# Patient Record
Sex: MALE | Race: WHITE | NOT HISPANIC OR LATINO
[De-identification: names, ages, dates, MRNs, and addresses within clinical notes are randomized per-mention and may not be internally consistent; named-entity substitution may affect disease eponyms.]

---

## 2024-07-17 PROBLEM — Z00.00 ENCOUNTER FOR PREVENTIVE HEALTH EXAMINATION: Status: ACTIVE | Noted: 2024-07-17

## 2024-07-29 ENCOUNTER — APPOINTMENT (OUTPATIENT)
Dept: GASTROENTEROLOGY | Facility: CLINIC | Age: 59
End: 2024-07-29
Payer: COMMERCIAL

## 2024-07-29 PROCEDURE — 91200 LIVER ELASTOGRAPHY: CPT

## 2024-07-30 NOTE — ASSESSMENT
[FreeTextEntry1] : FibroScan/ Vibration Controlled Transient Elastography (VCTE) Report   PROBE SIZE: M   INDICATION: ENCINAS/RIKKI   REFERRING PHYSICIAN: Dr. TRACY Sanchez     PROCEDURE:   Patient was NPO for 4 hours prior to procedure. After providing oral explanations of the procedure to the patient, patient was placed in supine position with right arm in maximum abduction to allow optimal exposure of right lateral abdomen. Patient abdomen was briefly assessed to identify to the terminus of the xyphoid process. The ideal target testing spot was located mid-line and lateral to this point. To acquire proper signals, the skin to liver capsule distance was accessed with the FibroScan probe. Patient was instructed to breathe normally and to abstain from sudden movements during the procedure. Ten shear waves were produced and measurements of the speed of each wave evaluated. Patient tolerated the procedure well and was discharged without incident.     FINDINGS:   - Median shear wave speed of 1.25 meters/second.   - This corresponds to a median Liver Stiffness Score of 4.8 kPa.   - IQR/med 23%   -  dB/m   RESULTS based on  reference scales:   FIBROSIS: F0 Fibrosis   STEATOSIS: S0 - Stage (0-10% steatosis)   The results regarding fibrosis stage and/or steatosis grade are based on current published scientific literature and the provided patient's diagnosis. Any further medical or surgical intervention should be made by considering the overall medical condition of the patient.   Please note transient elastography does not directly measure liver fibrosis and fat content, therefore over-estimation of liver fibrosis may be observed in several conditions, including but not limited to: liver congestion, active hepatitis, ascites, mass within the liver. Clinical correlation is necessary, and liver stiffness readings need to be interpreted with consideration to these potential confounding factors.

## 2025-03-06 ENCOUNTER — APPOINTMENT (OUTPATIENT)
Dept: ORTHOPEDIC SURGERY | Facility: CLINIC | Age: 60
End: 2025-03-06
Payer: COMMERCIAL

## 2025-03-06 VITALS — BODY MASS INDEX: 33.8 KG/M2 | HEIGHT: 73 IN | WEIGHT: 255 LBS

## 2025-03-06 DIAGNOSIS — M23.91 UNSPECIFIED INTERNAL DERANGEMENT OF RIGHT KNEE: ICD-10-CM

## 2025-03-06 DIAGNOSIS — M17.11 UNILATERAL PRIMARY OSTEOARTHRITIS, RIGHT KNEE: ICD-10-CM

## 2025-03-06 PROCEDURE — 20610 DRAIN/INJ JOINT/BURSA W/O US: CPT | Mod: RT

## 2025-03-06 PROCEDURE — 99204 OFFICE O/P NEW MOD 45 MIN: CPT | Mod: 25

## 2025-03-06 PROCEDURE — 73564 X-RAY EXAM KNEE 4 OR MORE: CPT | Mod: RT

## 2025-03-06 RX ORDER — MELOXICAM 15 MG/1
15 TABLET ORAL
Qty: 30 | Refills: 0 | Status: ACTIVE | COMMUNITY
Start: 2025-03-06 | End: 1900-01-01

## 2025-04-02 ENCOUNTER — NON-APPOINTMENT (OUTPATIENT)
Age: 60
End: 2025-04-02

## 2025-04-03 ENCOUNTER — APPOINTMENT (OUTPATIENT)
Dept: ORTHOPEDIC SURGERY | Facility: CLINIC | Age: 60
End: 2025-04-03
Payer: COMMERCIAL

## 2025-04-03 VITALS — HEIGHT: 73 IN | WEIGHT: 255 LBS | BODY MASS INDEX: 33.8 KG/M2

## 2025-04-03 DIAGNOSIS — M23.91 UNSPECIFIED INTERNAL DERANGEMENT OF RIGHT KNEE: ICD-10-CM

## 2025-04-03 PROCEDURE — 99213 OFFICE O/P EST LOW 20 MIN: CPT
